# Patient Record
Sex: FEMALE | URBAN - METROPOLITAN AREA
[De-identification: names, ages, dates, MRNs, and addresses within clinical notes are randomized per-mention and may not be internally consistent; named-entity substitution may affect disease eponyms.]

---

## 2022-08-09 ENCOUNTER — OFFICE VISIT (OUTPATIENT)
Dept: URBAN - METROPOLITAN AREA CLINIC 128 | Facility: CLINIC | Age: 27
End: 2022-08-09

## 2022-08-09 RX ORDER — HYDROCORTISONE 25 MG/G
1 APPLICATION CREAM TOPICAL TWICE A DAY
OUTPATIENT

## 2022-08-09 NOTE — PHYSICAL EXAM GASTROINTESTINAL
Abdomen , soft, nontender, nondistended , no guarding or rigidity , no masses palpable , normal bowel sounds , Liver and Spleen , no hepatomegaly present Rectal  , normal sphincter tone, no external or internal hemorrhoids, rectal masses, or bleeding present. Rectal examination was performed with a chaperone present

## 2022-08-09 NOTE — HPI-OTHER HISTORIES
The patient presents today due to the following symptoms: Duration of symptoms: Associated symptoms: What alleviates symptoms: What aggravates symptoms: There is no family history of colon polyps or colon cancer.  Last colonoscopy: STEPH:'[id number]'